# Patient Record
Sex: FEMALE | Race: WHITE | NOT HISPANIC OR LATINO | URBAN - METROPOLITAN AREA
[De-identification: names, ages, dates, MRNs, and addresses within clinical notes are randomized per-mention and may not be internally consistent; named-entity substitution may affect disease eponyms.]

---

## 2020-07-25 ENCOUNTER — TELEPHONE ENCOUNTER (OUTPATIENT)
Dept: URBAN - METROPOLITAN AREA CLINIC 13 | Facility: CLINIC | Age: 25
End: 2020-07-25

## 2020-07-25 RX ORDER — OMEPRAZOLE 20 MG/1
TAKE 1 CAPSULE TWICE DAILY 30-60 MINUTES BEFORE BREAKFAST AND DINNER CAPSULE, DELAYED RELEASE ORAL
Qty: 60 | Refills: 3 | OUTPATIENT
Start: 2016-02-04 | End: 2016-04-07

## 2020-07-26 ENCOUNTER — TELEPHONE ENCOUNTER (OUTPATIENT)
Dept: URBAN - METROPOLITAN AREA CLINIC 13 | Facility: CLINIC | Age: 25
End: 2020-07-26

## 2020-07-26 RX ORDER — ESOMEPRAZOLE MAGNESIUM 20 MG/1
GRANULE, DELAYED RELEASE ORAL
Refills: 0 | Status: ACTIVE | COMMUNITY

## 2021-08-31 ENCOUNTER — WEB ENCOUNTER (OUTPATIENT)
Dept: URBAN - METROPOLITAN AREA CLINIC 113 | Facility: CLINIC | Age: 26
End: 2021-08-31

## 2021-08-31 ENCOUNTER — OFFICE VISIT (OUTPATIENT)
Dept: URBAN - METROPOLITAN AREA CLINIC 113 | Facility: CLINIC | Age: 26
End: 2021-08-31
Payer: COMMERCIAL

## 2021-08-31 VITALS
SYSTOLIC BLOOD PRESSURE: 106 MMHG | BODY MASS INDEX: 20.73 KG/M2 | HEIGHT: 66 IN | TEMPERATURE: 98.2 F | HEART RATE: 66 BPM | DIASTOLIC BLOOD PRESSURE: 65 MMHG | WEIGHT: 129 LBS

## 2021-08-31 DIAGNOSIS — R11.0 NAUSEA: ICD-10-CM

## 2021-08-31 DIAGNOSIS — R14.0 ABDOMINAL BLOATING: ICD-10-CM

## 2021-08-31 DIAGNOSIS — K21.9 GASTROESOPHAGEAL REFLUX DISEASE, UNSPECIFIED WHETHER ESOPHAGITIS PRESENT: ICD-10-CM

## 2021-08-31 PROCEDURE — 99203 OFFICE O/P NEW LOW 30 MIN: CPT | Performed by: INTERNAL MEDICINE

## 2021-08-31 RX ORDER — ESOMEPRAZOLE MAGNESIUM 20 MG/1
GRANULE, DELAYED RELEASE ORAL
Refills: 0 | Status: ON HOLD | COMMUNITY

## 2021-08-31 RX ORDER — NORETHINDRONE ACETATE AND ETHINYL ESTRADIOL 1.5; 3 MG/1; UG/1
1 TABLET TABLET ORAL ONCE A DAY
Status: ACTIVE | COMMUNITY

## 2021-08-31 NOTE — HPI-TODAY'S VISIT:
Ms. Flores is a 25-year-old woman with no chronic medical conditions, presenting for evaluation regarding acid reflux. She was last seen in this office in 2016 for follow-up regarding chronic acid reflux.  She was recommended to adhere to strict regimen consisting of Nexium 30 minutes before first meal of the day and lifestyle modifications were reinforced.  She also reported some complaints of dysphagia with pills.  A barium swallow was performed to evaluate for gastroesophageal anatomy and for gastroesophageal reflux.  Barium swallow on 4/20/2016 revealed a very small hiatal hernia, otherwise no significant findings.   She reports a chronic history of intermittent acid reflux symptoms. She is unable  to recall if Nexium provided her relief in the past. Denies any improvements with Tums as needed. Her symptoms are "severe" after drinking alcohol and eating most meals. She also reports associated nausea, excessive belching and a "gurgling" sensation in her throat. She reports an isolated episode of dysphagia "a long time ago". Denies regurgitation or nighttime symptoms. She reports infrequent episodes of loose stools, but otherwise she is having 1-2 soft, formed bowel movements daily without red blood per rectum or melena. She denies abdominal pain or constipation. Her weight remains stable. She denies any significant changes in her past medical or surgical history since her last office visit.

## 2021-09-01 LAB — T-TRANSGLUTAMINASE (TTG) IGA: <2

## 2021-09-03 ENCOUNTER — WEB ENCOUNTER (OUTPATIENT)
Dept: URBAN - METROPOLITAN AREA SURGERY CENTER 25 | Facility: SURGERY CENTER | Age: 26
End: 2021-09-03

## 2021-09-15 ENCOUNTER — WEB ENCOUNTER (OUTPATIENT)
Dept: URBAN - METROPOLITAN AREA CLINIC 113 | Facility: CLINIC | Age: 26
End: 2021-09-15

## 2021-10-05 ENCOUNTER — WEB ENCOUNTER (OUTPATIENT)
Dept: URBAN - METROPOLITAN AREA CLINIC 113 | Facility: CLINIC | Age: 26
End: 2021-10-05

## 2021-10-05 ENCOUNTER — OFFICE VISIT (OUTPATIENT)
Dept: URBAN - METROPOLITAN AREA CLINIC 113 | Facility: CLINIC | Age: 26
End: 2021-10-05
Payer: COMMERCIAL

## 2021-10-05 VITALS
HEART RATE: 88 BPM | TEMPERATURE: 98 F | BODY MASS INDEX: 21.05 KG/M2 | RESPIRATION RATE: 18 BRPM | DIASTOLIC BLOOD PRESSURE: 68 MMHG | HEIGHT: 66 IN | WEIGHT: 131 LBS | SYSTOLIC BLOOD PRESSURE: 120 MMHG

## 2021-10-05 DIAGNOSIS — R11.0 NAUSEA: ICD-10-CM

## 2021-10-05 DIAGNOSIS — R14.0 ABDOMINAL BLOATING: ICD-10-CM

## 2021-10-05 DIAGNOSIS — K21.9 GASTROESOPHAGEAL REFLUX DISEASE, UNSPECIFIED WHETHER ESOPHAGITIS PRESENT: ICD-10-CM

## 2021-10-05 DIAGNOSIS — R19.5 LOOSE STOOLS: ICD-10-CM

## 2021-10-05 PROCEDURE — 99214 OFFICE O/P EST MOD 30 MIN: CPT | Performed by: PHYSICIAN ASSISTANT

## 2021-10-05 RX ORDER — NORETHINDRONE ACETATE AND ETHINYL ESTRADIOL 1.5; 3 MG/1; UG/1
1 TABLET TABLET ORAL ONCE A DAY
Status: ACTIVE | COMMUNITY

## 2021-10-05 RX ORDER — ESOMEPRAZOLE MAGNESIUM 20 MG/1
GRANULE, DELAYED RELEASE ORAL
Refills: 0 | Status: DISCONTINUED | COMMUNITY

## 2021-10-05 RX ORDER — FAMOTIDINE 20 MG/1
1 TABLET AT BEDTIME AS NEEDED TABLET, FILM COATED ORAL ONCE A DAY
Status: ACTIVE | COMMUNITY

## 2021-10-05 RX ORDER — ESOMEPRAZOLE MAGNESIUM 20 MG/1
1 CAPSULE CAPSULE, DELAYED RELEASE ORAL ONCE A DAY
Status: ACTIVE | COMMUNITY

## 2021-10-05 NOTE — HPI-TODAY'S VISIT:
Ms. Flores is a 25-year-old woman, with no chronic medical conditions, presenting for follow-up. She was last seen on 8/31/2021 for evaluation regarding intermittent acid reflux symptoms.  She declined trial of pantoprazole 40 mg once daily, and wished to use over-the-counter Nexium.  An EGD was  considered pending clinical course.  She also reported complaints of intermittent nausea, abdominal bloating and intermittent loose stools.  Labs to rule out celiac disease were obtained at that time.  Labs on 8/31/2021 were negative.  She reports some improvements in her acid reflux/heartburn symptoms  and nausea with intermittent use of Pepcid.  She denies any improvements with Nexium. She report associated post prandial generalized abdominal discomfort. She believes she may have food allergies and is interested further testing.  There is no dysphagia or regurgitation.  Regarding her bowel habits, she continues to have 2-3 episodes of loose stools throughout the week. She has been unable to attribute this to any specific dietary triggers.  Denies nocturnal diarrhea, blood per rectum, melena or hematochezia. Her weight remains stable.

## 2021-11-02 ENCOUNTER — OFFICE VISIT (OUTPATIENT)
Dept: URBAN - METROPOLITAN AREA CLINIC 113 | Facility: CLINIC | Age: 26
End: 2021-11-02

## 2021-11-17 ENCOUNTER — OFFICE VISIT (OUTPATIENT)
Dept: URBAN - METROPOLITAN AREA CLINIC 113 | Facility: CLINIC | Age: 26
End: 2021-11-17
Payer: COMMERCIAL

## 2021-11-17 VITALS
DIASTOLIC BLOOD PRESSURE: 73 MMHG | HEART RATE: 72 BPM | SYSTOLIC BLOOD PRESSURE: 112 MMHG | BODY MASS INDEX: 20.57 KG/M2 | WEIGHT: 128 LBS | HEIGHT: 66 IN | TEMPERATURE: 97.5 F

## 2021-11-17 DIAGNOSIS — K21.9 GASTROESOPHAGEAL REFLUX DISEASE, UNSPECIFIED WHETHER ESOPHAGITIS PRESENT: ICD-10-CM

## 2021-11-17 DIAGNOSIS — R11.0 NAUSEA: ICD-10-CM

## 2021-11-17 DIAGNOSIS — R14.0 ABDOMINAL BLOATING: ICD-10-CM

## 2021-11-17 DIAGNOSIS — R19.5 LOOSE STOOLS: ICD-10-CM

## 2021-11-17 PROBLEM — 116289008: Status: ACTIVE | Noted: 2021-08-31

## 2021-11-17 PROBLEM — 422587007: Status: ACTIVE | Noted: 2021-08-31

## 2021-11-17 PROCEDURE — 99213 OFFICE O/P EST LOW 20 MIN: CPT | Performed by: PHYSICIAN ASSISTANT

## 2021-11-17 RX ORDER — NORETHINDRONE ACETATE AND ETHINYL ESTRADIOL 1.5; 3 MG/1; UG/1
1 TABLET TABLET ORAL ONCE A DAY
Status: ACTIVE | COMMUNITY

## 2021-11-17 RX ORDER — FAMOTIDINE 20 MG/1
1 TABLET AT BEDTIME AS NEEDED TABLET, FILM COATED ORAL ONCE A DAY
Status: ACTIVE | COMMUNITY

## 2021-11-17 RX ORDER — ESOMEPRAZOLE MAGNESIUM 20 MG/1
1 CAPSULE CAPSULE, DELAYED RELEASE ORAL ONCE A DAY
Status: ON HOLD | COMMUNITY

## 2021-11-17 NOTE — HPI-TODAY'S VISIT:
Ms. Flores is a 25-year-old woman with no chronic medical conditions, presenting for follow-up regarding GERD, abdominal bloating and loose stools. She was last seen on 10/5/2021 for follow-up regarding intermittent acid reflux symptoms and nausea responsive to OTC Pepcid.  An EGD was considered at that time, but she preferred to proceed with more conservative management.  Regarding her intermittent loose stools and abdominal bloating, she was to add Benefiber to her bowel regimen.  She was also to  trial IBgard as needed for symptomatic relief. She says that her symptoms have significantly improved with Pepcid every other day and daily probiotic.  She has infrequent breakthrough symptoms with consuming foods such as Aston bars and alcohol.  She denies regurgitation, odynophagia or nocturnal acid reflux.  Denies any unintentional weight loss.  She states that her bowel habits are fairly regular and loose in consistency.  She has up to 2 loose, soft bowel movements daily without gross red blood per rectum or melena.  On occasion she notices an increase in stool frequency which is diet dependent.  She denies nocturnal diarrhea or pain with defecation.

## 2022-05-17 ENCOUNTER — OFFICE VISIT (OUTPATIENT)
Dept: URBAN - METROPOLITAN AREA CLINIC 113 | Facility: CLINIC | Age: 27
End: 2022-05-17

## 2022-06-23 ENCOUNTER — OFFICE VISIT (OUTPATIENT)
Dept: URBAN - METROPOLITAN AREA CLINIC 113 | Facility: CLINIC | Age: 27
End: 2022-06-23

## 2022-06-23 RX ORDER — NORETHINDRONE ACETATE AND ETHINYL ESTRADIOL 1.5; 3 MG/1; UG/1
1 TABLET TABLET ORAL ONCE A DAY
Status: ACTIVE | COMMUNITY

## 2022-06-23 RX ORDER — FAMOTIDINE 20 MG/1
1 TABLET AT BEDTIME AS NEEDED TABLET, FILM COATED ORAL ONCE A DAY
Status: ACTIVE | COMMUNITY

## 2022-06-23 RX ORDER — ESOMEPRAZOLE MAGNESIUM 20 MG/1
1 CAPSULE CAPSULE, DELAYED RELEASE ORAL ONCE A DAY
Status: ON HOLD | COMMUNITY

## 2022-06-23 NOTE — HPI-TODAY'S VISIT:
Ms. Flores is a 25-year-old woman with no chronic medical conditions, presenting for follow-up regarding GERD, abdominal bloating and loose stools.  She was last seen on 10/5/2021 for follow-up regarding intermittent acid reflux symptoms and nausea responsive to OTC Pepcid.  An EGD was considered at that time, but she preferred to proceed with more conservative management.  Regarding her intermittent loose stools and abdominal bloating, she was to add Benefiber to her bowel regimen.  She was also to  trial IBgard as needed for symptomatic relief.  She says that her symptoms have significantly improved with Pepcid every other day and daily probiotic.  She has infrequent breakthrough symptoms with consuming foods such as Aston bars and alcohol.  She denies regurgitation, odynophagia or nocturnal acid reflux.  Denies any unintentional weight loss.  She states that her bowel habits are fairly regular and loose in consistency.  She has up to 2 loose, soft bowel movements daily without gross red blood per rectum or melena.  On occasion she notices an increase in stool frequency which is diet dependent.  She denies nocturnal diarrhea or pain with defecation.  Interval history: 26-year-old female presents for follow-up.  She was last seen on 11/17/2021.  Her GERD, nausea and abdominal bloating and largely improved with regimen consisting of daily probiotic and Pepcid as needed.  She does have intermittent loose stools which were not bothersome for the patient.  She was encouraged to use Benefiber daily for bowel maintenance.

## 2022-08-03 ENCOUNTER — WEB ENCOUNTER (OUTPATIENT)
Dept: URBAN - METROPOLITAN AREA CLINIC 113 | Facility: CLINIC | Age: 27
End: 2022-08-03

## 2022-08-03 ENCOUNTER — OFFICE VISIT (OUTPATIENT)
Dept: URBAN - METROPOLITAN AREA CLINIC 113 | Facility: CLINIC | Age: 27
End: 2022-08-03
Payer: COMMERCIAL

## 2022-08-03 ENCOUNTER — DASHBOARD ENCOUNTERS (OUTPATIENT)
Age: 27
End: 2022-08-03

## 2022-08-03 VITALS
TEMPERATURE: 97.7 F | WEIGHT: 128 LBS | HEIGHT: 66 IN | DIASTOLIC BLOOD PRESSURE: 68 MMHG | SYSTOLIC BLOOD PRESSURE: 109 MMHG | HEART RATE: 73 BPM | RESPIRATION RATE: 20 BRPM | BODY MASS INDEX: 20.57 KG/M2

## 2022-08-03 DIAGNOSIS — K21.9 GASTROESOPHAGEAL REFLUX DISEASE, UNSPECIFIED WHETHER ESOPHAGITIS PRESENT: ICD-10-CM

## 2022-08-03 DIAGNOSIS — R19.5 LOOSE STOOLS: ICD-10-CM

## 2022-08-03 DIAGNOSIS — R14.2 BELCHING: ICD-10-CM

## 2022-08-03 PROBLEM — 235595009 GASTROESOPHAGEAL REFLUX DISEASE: Status: ACTIVE | Noted: 2022-08-03

## 2022-08-03 PROCEDURE — 99213 OFFICE O/P EST LOW 20 MIN: CPT

## 2022-08-03 RX ORDER — ESOMEPRAZOLE MAGNESIUM 20 MG/1
1 CAPSULE CAPSULE, DELAYED RELEASE ORAL ONCE A DAY
Status: ON HOLD | COMMUNITY

## 2022-08-03 RX ORDER — FAMOTIDINE 20 MG/1
1 TABLET AT BEDTIME AS NEEDED TABLET, FILM COATED ORAL ONCE A DAY
Status: ACTIVE | COMMUNITY

## 2022-08-03 RX ORDER — NORETHINDRONE ACETATE AND ETHINYL ESTRADIOL 1.5; 3 MG/1; UG/1
1 TABLET TABLET ORAL ONCE A DAY
Status: ON HOLD | COMMUNITY

## 2022-08-03 RX ORDER — NORETHINDRONE ACETATE AND ETHINYL ESTRADIOL 1MG-20(21)
1 TABLET KIT ORAL ONCE A DAY
Status: ACTIVE | COMMUNITY

## 2022-08-03 NOTE — HPI-TODAY'S VISIT:
Ms. Flores is a 25-year-old woman with no chronic medical conditions, presenting for follow-up regarding GERD, abdominal bloating and loose stools.  She was last seen on 10/5/2021 for follow-up regarding intermittent acid reflux symptoms and nausea responsive to OTC Pepcid.  An EGD was considered at that time, but she preferred to proceed with more conservative management.  Regarding her intermittent loose stools and abdominal bloating, she was to add Benefiber to her bowel regimen.  She was also to  trial IBgard as needed for symptomatic relief.  She says that her symptoms have significantly improved with Pepcid every other day and daily probiotic.  She has infrequent breakthrough symptoms with consuming foods such as Óscar bars and alcohol.  She denies regurgitation, odynophagia or nocturnal acid reflux.  Denies any unintentional weight loss.  She states that her bowel habits are fairly regular and loose in consistency.  She has up to 2 loose, soft bowel movements daily without gross red blood per rectum or melena.  On occasion, she notices an increase in stool frequency which is diet dependent.  She denies nocturnal diarrhea or pain with defecation.  Interval history: 26-year-old female presents for follow-up.  She was last seen on 11/17/2021.  Her GERD, nausea and abdominal bloating had largely improved with regimen consisting of daily probiotic and Pepcid as needed.  She does have intermittent loose stools which were not bothersome for the patient.  She was encouraged to use Benefiber daily for bowel maintenance.  She has tried and failed Nexium in the past. She continues to have throat gurgling and belching which is bothersome to her. This tends to worsen after eating certain foods or drinking ETOH. She does take a daily Seed probiotic. When she forgets to take this she notices worsening upper GI symptoms. Her bowels remain regular with this probiotic as well. She never tried IBgard.

## 2022-11-03 ENCOUNTER — OFFICE VISIT (OUTPATIENT)
Dept: URBAN - METROPOLITAN AREA CLINIC 113 | Facility: CLINIC | Age: 27
End: 2022-11-03